# Patient Record
(demographics unavailable — no encounter records)

---

## 2025-06-06 NOTE — HISTORY OF PRESENT ILLNESS
[Born at ___ Wks Gestation] : The patient was born at [unfilled] weeks gestation [] : via normal spontaneous vaginal delivery [[facility]] : at [facility] [(1) _____] : [unfilled] [(5) _____] : [unfilled] [BW: _____] : weight of [unfilled] [Length: _____] : length of [unfilled] [HC: _____] : head circumference of [unfilled] [DW: _____] : Discharge weight was [unfilled] [Age: ___] : [unfilled] year old mother [G: ___] : G [unfilled] [P: ___] : P [unfilled] [GBS] : GBS positive [Rubella (Immune)] : Rubella immune [None] : There are no risk factors [TcB: _____] : Transcutaneous Bilirubin [unfilled] mg/dL [Phototherapy Threshold: _____] : Phototherapy level per Bilitool [unfilled] (mg/dL) [Yes] : Yes [Antibiotics: ______] : antibiotics ([unfilled]) [Expressed Breast milk ___oz/feed] : [unfilled] oz of expressed breast milk per feed [Formula ___ oz/feed] : [unfilled] oz of formula per feed [Hours between feeds ___] : Child is fed every [unfilled] hours [Vitamins ___] : Patient takes [unfilled] vitamins daily [Normal] : Normal [___ voids per day] : [unfilled] voids per day [Frequency of stools: ___] : Frequency of stools: [unfilled]  stools [Yellow] : yellow [Seedy] : seedy [Loose] : loose consistency [In Bassinet/Crib] : sleeps in bassinet/crib [Loose bedding, pillow, toys, and/or bumpers in crib] : loose bedding, pillow, toys, and/or bumpers in crib [No] : No cigarette smoke exposure [Water heater temperature set at <120 degrees F] : Water heater temperature set at <120 degrees F [Rear facing car seat in back seat] : Rear facing car seat in back seat [Carbon Monoxide Detectors] : Carbon monoxide detectors at home [Smoke Detectors] : Smoke detectors at home. [NO] : No [HepBsAG] : HepBsAg negative [HIV] : HIV negative [HepC] : Hepatitis C negative [VDRL/RPR (Reactive)] : VDRL/RPR nonreactive [de-identified] : 36 [FreeTextEntry8] : Mother A+ CCHD/Hearing Passed [id number] [On back] : does not sleep on back [Co-sleeping] : no co-sleeping [Pacifier] : Not using pacifier [Exposure to electronic nicotine delivery system] : No exposure to electronic nicotine delivery system [Hepatitis B Vaccine Given] : Hepatitis B vaccine not given

## 2025-06-06 NOTE — PHYSICAL EXAM
[Alert] : alert [Normocephalic] : normocephalic [Flat Open Anterior [place]] : flat open anterior fontanelle [PERRL] : PERRL [Red Reflex Bilateral] : red reflex bilateral [Normally Placed Ears] : normally placed ears [Auricles Well Formed] : auricles well formed [Clear Tympanic membranes] : clear tympanic membranes [Light reflex present] : light reflex present [Bony landmarks visible] : bony landmarks visible [Nares Patent] : nares patent [Palate Intact] : palate intact [Uvula Midline] : uvula midline [Supple, full passive range of motion] : supple, full passive range of motion [Symmetric Chest Rise] : symmetric chest rise [Clear to Auscultation Bilaterally] : clear to auscultation bilaterally [Regular Rate and Rhythm] : regular rate and rhythm [S1, S2 present] : S1, S2 present [+2 Femoral Pulses] : +2 femoral pulses [Soft] : soft [Bowel Sounds] : bowel sounds present [Normal external genitailia] : normal external genitalia [Patent Vagina] : normal vagina introitus [Patent] : patent [Normally Placed] : normally placed [No Abnormal Lymph Nodes Palpated] : no abnormal lymph nodes palpated [Symmetric Flexed Extremities] : symmetric flexed extremities [Straight] : straight [Startle Reflex] : startle reflex present [Suck Reflex] : suck reflex present [Rooting] : rooting reflex present [Palmar Grasp] : palmar grasp reflex present [Plantar Grasp] : plantar grasp reflex present [Symmetric Jose] : symmetric [place] [Acute Distress] : no acute distress [Icteric sclera] : nonicteric sclera [Discharge] : no discharge [Palpable Masses] : no palpable masses [Murmurs] : no murmurs [Tender] : nontender [Distended] : not distended [Hepatomegaly] : no hepatomegaly [Splenomegaly] : no splenomegaly [Clitoromegaly] : no clitoromegaly [Sherman-Ortolani] : negative Sherman-Ortolani [Spinal Dimple] : no spinal dimple [Tuft of Hair] : no tuft of hair [Jaundice] : not jaundice [FreeTextEntry2] : tiny scab on scalp

## 2025-06-06 NOTE — DISCUSSION/SUMMARY
[Normal Growth] : growth [Normal Development] : developmental [No Elimination Concerns] : elimination [Continue Regimen] : feeding [No Skin Concerns] : skin [Normal Sleep Pattern] : sleep [None] : no known medical problems [Anticipatory Guidance Given] : Anticipatory guidance addressed as per the history of present illness section [No Vaccines] : no vaccines needed [No Medications] : ~He/She~ is not on any medications [Parent/Guardian] : Parent/Guardian [Hepatitis B In Hospital] : Hepatitis B not administered while in the hospital

## 2025-06-13 NOTE — HISTORY OF PRESENT ILLNESS
[Born at ___ Wks Gestation] : The patient was born at [unfilled] weeks gestation [] : via normal spontaneous vaginal delivery [[facility]] : at [facility] [(1) _____] : [unfilled] [(5) _____] : [unfilled] [BW: _____] : weight of [unfilled] [HC: _____] : head circumference of [unfilled] [Length: _____] : length of [unfilled] [DW: _____] : Discharge weight was [unfilled] [Age: ___] : [unfilled] year old mother [G: ___] : G [unfilled] [P: ___] : P [unfilled] [Rubella (Immune)] : Rubella immune [GBS] : GBS positive [None] : There are no risk factors [Antibiotics: ______] : antibiotics ([unfilled]) [TcB: _____] : Transcutaneous Bilirubin [unfilled] mg/dL [Phototherapy Threshold: _____] : Phototherapy level per Bilitool [unfilled] (mg/dL) [Yes] : Yes [Normal] : Normal [No] : No cigarette smoke exposure [Water heater temperature set at <120 degrees F] : Water heater temperature set at <120 degrees F [Rear facing car seat in back seat] : Rear facing car seat in back seat [Carbon Monoxide Detectors] : Carbon monoxide detectors at home [Smoke Detectors] : Smoke detectors at home. [Breast milk] : breast milk [Hours between feeds ___] : Child is fed every [unfilled] hours [Vitamins ___] : Patient takes [unfilled] vitamins daily [___ voids per day] : [unfilled] voids per day [Frequency of stools: ___] : Frequency of stools: [unfilled]  stools [Yellow] : yellow [Seedy] : seedy [Loose] : loose consistency [In Bassinet/Crib] : sleeps in bassinet/crib [On back] : sleeps on back [Pacifier] : Uses pacifier [HepBsAG] : HepBsAg negative [HIV] : HIV negative [HepC] : Hepatitis C negative [VDRL/RPR (Reactive)] : VDRL/RPR nonreactive [de-identified] : 36 [FreeTextEntry8] : Mother A+ CCHD/Hearing Passed [id number] [Co-sleeping] : no co-sleeping [Loose bedding, pillow, toys, and/or bumpers in crib] : no loose bedding, pillow, toys, and/or bumpers in crib [Exposure to electronic nicotine delivery system] : No exposure to electronic nicotine delivery system [Hepatitis B Vaccine Given] : Hepatitis B vaccine not given

## 2025-06-13 NOTE — HISTORY OF PRESENT ILLNESS
[Born at ___ Wks Gestation] : The patient was born at [unfilled] weeks gestation [] : via normal spontaneous vaginal delivery [[facility]] : at [facility] [(1) _____] : [unfilled] [(5) _____] : [unfilled] [BW: _____] : weight of [unfilled] [Length: _____] : length of [unfilled] [HC: _____] : head circumference of [unfilled] [DW: _____] : Discharge weight was [unfilled] [Age: ___] : [unfilled] year old mother [G: ___] : G [unfilled] [P: ___] : P [unfilled] [Rubella (Immune)] : Rubella immune [GBS] : GBS positive [None] : There are no risk factors [Antibiotics: ______] : antibiotics ([unfilled]) [TcB: _____] : Transcutaneous Bilirubin [unfilled] mg/dL [Phototherapy Threshold: _____] : Phototherapy level per Bilitool [unfilled] (mg/dL) [Yes] : Yes [Normal] : Normal [No] : No cigarette smoke exposure [Water heater temperature set at <120 degrees F] : Water heater temperature set at <120 degrees F [Rear facing car seat in back seat] : Rear facing car seat in back seat [Smoke Detectors] : Smoke detectors at home. [Carbon Monoxide Detectors] : Carbon monoxide detectors at home [Breast milk] : breast milk [Hours between feeds ___] : Child is fed every [unfilled] hours [Vitamins ___] : Patient takes [unfilled] vitamins daily [___ voids per day] : [unfilled] voids per day [Frequency of stools: ___] : Frequency of stools: [unfilled]  stools [Yellow] : yellow [Seedy] : seedy [Loose] : loose consistency [In Bassinet/Crib] : sleeps in bassinet/crib [On back] : sleeps on back [Pacifier] : Uses pacifier [HepBsAG] : HepBsAg negative [HIV] : HIV negative [HepC] : Hepatitis C negative [VDRL/RPR (Reactive)] : VDRL/RPR nonreactive [de-identified] : 36 [FreeTextEntry8] : Mother A+ CCHD/Hearing Passed [id number] [Co-sleeping] : no co-sleeping [Loose bedding, pillow, toys, and/or bumpers in crib] : no loose bedding, pillow, toys, and/or bumpers in crib [Exposure to electronic nicotine delivery system] : No exposure to electronic nicotine delivery system [Hepatitis B Vaccine Given] : Hepatitis B vaccine not given

## 2025-06-13 NOTE — PHYSICAL EXAM
[Alert] : alert [Normocephalic] : normocephalic [Flat Open Anterior [place]] : flat open anterior fontanelle [PERRL] : PERRL [Red Reflex Bilateral] : red reflex bilateral [Normally Placed Ears] : normally placed ears [Auricles Well Formed] : auricles well formed [Clear Tympanic membranes] : clear tympanic membranes [Light reflex present] : light reflex present [Bony landmarks visible] : bony landmarks visible [Nares Patent] : nares patent [Palate Intact] : palate intact [Uvula Midline] : uvula midline [Supple, full passive range of motion] : supple, full passive range of motion [Symmetric Chest Rise] : symmetric chest rise [Clear to Auscultation Bilaterally] : clear to auscultation bilaterally [Regular Rate and Rhythm] : regular rate and rhythm [S1, S2 present] : S1, S2 present [+2 Femoral Pulses] : +2 femoral pulses [Soft] : soft [Bowel Sounds] : bowel sounds present [Normal external genitailia] : normal external genitalia [Patent Vagina] : normal vagina introitus [Patent] : patent [Normally Placed] : normally placed [No Abnormal Lymph Nodes Palpated] : no abnormal lymph nodes palpated [Symmetric Flexed Extremities] : symmetric flexed extremities [Straight] : straight [Startle Reflex] : startle reflex present [Suck Reflex] : suck reflex present [Rooting] : rooting reflex present [Palmar Grasp] : palmar grasp reflex present [Plantar Grasp] : plantar grasp reflex present [Symmetric Jose] : symmetric [place] [Acute Distress] : no acute distress [Icteric sclera] : nonicteric sclera [Discharge] : no discharge [Palpable Masses] : no palpable masses [Murmurs] : no murmurs [Tender] : nontender [Distended] : not distended [Hepatomegaly] : no hepatomegaly [Splenomegaly] : no splenomegaly [Clitoromegaly] : no clitoromegaly [Sherman-Ortolani] : negative Sherman-Ortolani [Spinal Dimple] : no spinal dimple [Tuft of Hair] : no tuft of hair [Jaundice] : not jaundice

## 2025-06-13 NOTE — PHYSICAL EXAM
[Alert] : alert [Normocephalic] : normocephalic [Flat Open Anterior [place]] : flat open anterior fontanelle [PERRL] : PERRL [Red Reflex Bilateral] : red reflex bilateral [Normally Placed Ears] : normally placed ears [Auricles Well Formed] : auricles well formed [Clear Tympanic membranes] : clear tympanic membranes [Light reflex present] : light reflex present [Bony landmarks visible] : bony landmarks visible [Nares Patent] : nares patent [Palate Intact] : palate intact [Uvula Midline] : uvula midline [Supple, full passive range of motion] : supple, full passive range of motion [Symmetric Chest Rise] : symmetric chest rise [Clear to Auscultation Bilaterally] : clear to auscultation bilaterally [Regular Rate and Rhythm] : regular rate and rhythm [S1, S2 present] : S1, S2 present [+2 Femoral Pulses] : +2 femoral pulses [Soft] : soft [Bowel Sounds] : bowel sounds present [Normal external genitailia] : normal external genitalia [Patent Vagina] : normal vagina introitus [Patent] : patent [Normally Placed] : normally placed [No Abnormal Lymph Nodes Palpated] : no abnormal lymph nodes palpated [Symmetric Flexed Extremities] : symmetric flexed extremities [Straight] : straight [Startle Reflex] : startle reflex present [Suck Reflex] : suck reflex present [Rooting] : rooting reflex present [Palmar Grasp] : palmar grasp reflex present [Plantar Grasp] : plantar grasp reflex present [Symmetric Jose] : symmetric [place] [Acute Distress] : no acute distress [Icteric sclera] : nonicteric sclera [Discharge] : no discharge [Palpable Masses] : no palpable masses [Murmurs] : no murmurs [Tender] : nontender [Distended] : not distended [Hepatomegaly] : no hepatomegaly [Splenomegaly] : no splenomegaly [Clitoromegaly] : no clitoromegaly [Sherman-Ortolani] : negative Sherman-Ortolani [Spinal Dimple] : no spinal dimple [Tuft of Hair] : no tuft of hair [Jaundice] : not jaundice

## 2025-06-20 NOTE — HISTORY OF PRESENT ILLNESS
[Breast milk] : breast milk [Expressed Breast milk ___oz/feed] : [unfilled] oz of expressed breast milk per feed [Formula ___ oz/feed] : [unfilled] oz of formula per feed [Formula ___ oz in 24hrs] : [unfilled] oz of formula in 24 hours [Hours between feeds ___] : Child is fed every [unfilled] hours [Vitamins ___] : Patient takes [unfilled] vitamins daily [Normal] : Normal [___ voids per day] : [unfilled] voids per day [Frequency of stools: ___] : Frequency of stools: [unfilled]  stools [Yellow] : yellow [Seedy] : seedy [Loose] : loose consistency [In Bassinet/Crib] : sleeps in bassinet/crib [Loose bedding, pillow, toys, and/or bumpers in crib] : no loose bedding, pillow, toys, and/or bumpers in crib [Pacifier] : Uses pacifier [No] : No cigarette smoke exposure [Exposure to electronic nicotine delivery system] : No exposure to electronic nicotine delivery system [Water heater temperature set at <120 degrees F] : Water heater temperature not set at <120 degrees F [Rear facing car seat in back seat] : Rear facing car seat in back seat [Carbon Monoxide Detectors] : Carbon monoxide detectors at home [Smoke Detectors] : Smoke detectors at home. [Hepatitis B Vaccine Given] : Hepatitis B vaccine not given [Hepatitis B] : Hepatitis B

## 2025-06-20 NOTE — DISCUSSION/SUMMARY
[FreeTextEntry1] : 21 d/o slow weight gain regained BW just 1/2 oz off, normal exam Return at 1 m/o for WBC Recommend exclusive breastfeeding, 8-12 feedings per day. Mother should continue prenatal vitamins and avoid alcohol. If formula is needed, recommend iron-fortified formulations every 2-3 hrs. When in car, patient should be in rear-facing car seat in back, Parents counseled to call if rectal temperature >100.4 degrees Limits baby exposure to others, especially those with fever or unknown vaccine status

## 2025-06-30 NOTE — DEVELOPMENTAL MILESTONES
[Passed] : passed [Normal Development] : Normal Development [None] : none [Calms when picked up or spoken to] : calms when picked up or spoken to [Looks briefly at objects] : looks briefly at objects [Alerts to unexpected sound] : alerts to unexpected sound [Makes brief short vowel sounds] : makes brief short vowel sounds [Holds chin up in prone] : holds chin up in prone [Holds fingers more open at rest] : holds fingers more open at rest [FreeTextEntry1] : D/w mother [FreeTextEntry2] : 5

## 2025-06-30 NOTE — DISCUSSION/SUMMARY
[Normal Growth] : growth [Normal Development] : development  [No Elimination Concerns] : elimination [Continue Regimen] : feeding [No Skin Concerns] : skin [Normal Sleep Pattern] : sleep [None] : no medical problems [Anticipatory Guidance Given] : Anticipatory guidance addressed as per the history of present illness section [Parental Well-Being] : parental well-being [Family Adjustment] : family adjustment [Feeding Routines] : feeding routines [Infant Adjustment] : infant adjustment [Safety] : safety [Age Approp Vaccines] : Age appropriate vaccines administered [No Medications] : ~He/She~ is not on any medications [Parent/Guardian] : Parent/Guardian [FreeTextEntry1] : 1 mo/o F - Doing well Normal Exam Hepatitis B and vaccines discussed/advised- parents will do vaccines but want them - made suggestions- no Hepatitis B today. Recommend continue breastfeeding/EBM/[place] formula.  Mother should continue prenatal vitamins and avoid alcohol. When in car, patient should be in rear-facing car seat in back seat. Put baby to sleep on back, in own crib with no loose or soft bedding. Help baby to develop sleep and feeding routines. May offer pacifier if needed. Start tummy time when awake. Limit baby's exposure to others, especially those with fever or unknown vaccine status. Parents counseled to call if rectal temperature >100.4 degrees F. Next CP in 1 month.

## 2025-06-30 NOTE — HISTORY OF PRESENT ILLNESS
[Normal] : Normal [In Bassinet/Crib] : sleeps in bassinet/crib [On back] : sleeps on back [Pacifier use] : Pacifier use [No] : No cigarette smoke exposure [Water heater temperature set at <120 degrees F] : Water heater temperature set at <120 degrees F [Rear facing car seat in back seat] : Rear facing car seat in back seat [Carbon Monoxide Detectors] : Carbon monoxide detectors at home [Smoke Detectors] : Smoke detectors at home. [NO] : No [Parents] : parents [Frequency of stools: ___] : Frequency of stools: [unfilled]  stools [per day] : per day. [Exposure to electronic nicotine delivery system] : No exposure to electronic nicotine delivery system [At risk for exposure to TB] : Not at risk for exposure to Tuberculosis  [de-identified] : Nursing every 3 hours about 2x/day/EBM 3-4 ozs every 3 hours/Formula- [place] 4 ozs for 2 days [FreeTextEntry3] : Sleeps about 4-5 hours at night   Naps fine

## 2025-06-30 NOTE — PHYSICAL EXAM
